# Patient Record
Sex: MALE | Race: BLACK OR AFRICAN AMERICAN | Employment: FULL TIME | URBAN - METROPOLITAN AREA
[De-identification: names, ages, dates, MRNs, and addresses within clinical notes are randomized per-mention and may not be internally consistent; named-entity substitution may affect disease eponyms.]

---

## 2017-09-16 ENCOUNTER — HOSPITAL ENCOUNTER (EMERGENCY)
Age: 27
Discharge: HOME | End: 2017-09-16
Attending: EMERGENCY MEDICINE
Payer: COMMERCIAL

## 2017-09-16 VITALS
BODY MASS INDEX: 22.27 KG/M2 | DIASTOLIC BLOOD PRESSURE: 76 MMHG | SYSTOLIC BLOOD PRESSURE: 120 MMHG | HEART RATE: 60 BPM | WEIGHT: 164.4 LBS | OXYGEN SATURATION: 99 % | HEIGHT: 72 IN | RESPIRATION RATE: 16 BRPM | TEMPERATURE: 98.2 F

## 2017-09-16 DIAGNOSIS — L02.219 CUTANEOUS ABSCESS OF TRUNK, UNSPECIFIED SITE OF TRUNK: Primary | ICD-10-CM

## 2017-09-16 PROCEDURE — 90715 TDAP VACCINE 7 YRS/> IM: CPT | Performed by: EMERGENCY MEDICINE

## 2017-09-16 PROCEDURE — 74011250637 HC RX REV CODE- 250/637: Performed by: EMERGENCY MEDICINE

## 2017-09-16 PROCEDURE — 74011000250 HC RX REV CODE- 250: Performed by: EMERGENCY MEDICINE

## 2017-09-16 PROCEDURE — 99283 EMERGENCY DEPT VISIT LOW MDM: CPT

## 2017-09-16 PROCEDURE — 75810000289 HC I&D ABSCESS SIMP/COMP/MULT

## 2017-09-16 PROCEDURE — 90471 IMMUNIZATION ADMIN: CPT

## 2017-09-16 PROCEDURE — 74011250636 HC RX REV CODE- 250/636: Performed by: EMERGENCY MEDICINE

## 2017-09-16 RX ORDER — CEPHALEXIN 500 MG/1
1000 CAPSULE ORAL
Status: COMPLETED | OUTPATIENT
Start: 2017-09-16 | End: 2017-09-16

## 2017-09-16 RX ORDER — CEPHALEXIN 500 MG/1
500 CAPSULE ORAL 4 TIMES DAILY
Qty: 28 CAP | Refills: 0 | Status: SHIPPED | OUTPATIENT
Start: 2017-09-16 | End: 2017-09-23

## 2017-09-16 RX ORDER — LIDOCAINE HYDROCHLORIDE 20 MG/ML
0.2 INJECTION, SOLUTION INFILTRATION; PERINEURAL ONCE
Status: COMPLETED | OUTPATIENT
Start: 2017-09-16 | End: 2017-09-16

## 2017-09-16 RX ADMIN — LIDOCAINE HYDROCHLORIDE 4 MG: 20 INJECTION, SOLUTION INFILTRATION; PERINEURAL at 15:41

## 2017-09-16 RX ADMIN — TETANUS TOXOID, REDUCED DIPHTHERIA TOXOID AND ACELLULAR PERTUSSIS VACCINE, ADSORBED 0.5 ML: 5; 2.5; 8; 8; 2.5 SUSPENSION INTRAMUSCULAR at 15:42

## 2017-09-16 RX ADMIN — CEPHALEXIN 1000 MG: 500 CAPSULE ORAL at 16:31

## 2017-09-16 NOTE — ED PROVIDER NOTES
Patient is a 32 y.o. male presenting with skin problem. Skin Problem       Pt states that he has had a tender area on the right side of his back for over 2 weeks. He states that he tried to \"drain\" it himself several days ago but it has gotten larger. Skin is locally red, firm with scabbed over area. There is no obvious bony deformity, bruising or extending erythema. Good neurovascular sensation. He has not had any medications today prior to arrival.      History reviewed. No pertinent past medical history. History reviewed. No pertinent surgical history. History reviewed. No pertinent family history. Social History     Social History    Marital status: SINGLE     Spouse name: N/A    Number of children: N/A    Years of education: N/A     Occupational History    Not on file. Social History Main Topics    Smoking status: Never Smoker    Smokeless tobacco: Never Used    Alcohol use Yes    Drug use: Not on file    Sexual activity: Not on file     Other Topics Concern    Not on file     Social History Narrative    No narrative on file         ALLERGIES: Review of patient's allergies indicates no known allergies. Review of Systems   Constitutional: Negative for activity change and appetite change. HENT: Negative for facial swelling, sore throat and trouble swallowing. Eyes: Negative. Respiratory: Negative for shortness of breath. Cardiovascular: Negative. Gastrointestinal: Negative for abdominal pain, diarrhea and vomiting. Genitourinary: Negative for dysuria. Musculoskeletal: Negative for back pain and neck pain. Skin: Positive for rash. Negative for color change. Tender firm, locally red area on the right flank area; suspect infected sebaceous cyst   Neurological: Negative for headaches. Psychiatric/Behavioral: Negative.         Vitals:    09/16/17 1332   BP: 126/83   Pulse: 62   Resp: 18   Temp: 98.3 °F (36.8 °C)   SpO2: 98%   Weight: 74.6 kg (164 lb 6.4 oz) Height: 6' (1.829 m)            Physical Exam   Constitutional: He is oriented to person, place, and time. He appears well-nourished. Black male; smoker   HENT:   Head: Normocephalic. Eyes: Pupils are equal, round, and reactive to light. Neck: Normal range of motion. Neck supple. Cardiovascular: Normal rate and regular rhythm. Pulmonary/Chest: Effort normal and breath sounds normal.   Abdominal: Soft. Bowel sounds are normal.   Musculoskeletal: Normal range of motion. Neurological: He is alert and oriented to person, place, and time. Skin: Skin is warm and dry. 3cm firm nodule with scabbed area on center; no bleeding, drainage or extending erythema   Nursing note and vitals reviewed. [facility]  ED Course       I&D Abcess Complex  Date/Time: 9/16/2017 4:22 PM  Performed by: Toño Ahmadi  Authorized by: Toño Ahmadi     Consent:     Consent obtained:  Verbal    Consent given by:  Patient    Risks discussed:  Incomplete drainage    Alternatives discussed:  No treatment and delayed treatment  Location:     Type:  Abscess (right side mid lateral back)    Size:  3cm    Location: right mid lateral back. Pre-procedure details:     Skin preparation:  Hibiclens  Anesthesia (see MAR for exact dosages): Anesthesia method:  None  Procedure type:     Complexity:  Complex  Procedure details:     Needle aspiration: no      Incision types:  Single with marsupialization    Incision depth:  Subcutaneous    Scalpel blade:  15    Wound management:  Probed and deloculated and irrigated with saline    Drainage:  Purulent    Drainage amount: Moderate    Wound treatment:  Drain placed    Packing materials:  1/4 in gauze  Post-procedure details:     Patient tolerance of procedure: Tolerated well, no immediate complications  Comments:      Reviewed skin recommendations with Jhonny Paulino. Follow up with the dermatologist if no improvement for further evaluation. Use only unscented soaps and lotions.   Good neurovascular sensation before and after the procedure. Janet Nichols NP            4:27 PM  Patient's results and plan of care have been reviewed with him. Patient and/or family have verbally conveyed their understanding and agreement of the patient's signs, symptoms, diagnosis, treatment and prognosis and additionally agree to follow up as recommended or return to the Emergency Room should his condition change prior to follow-up. Discharge instructions have also been provided to the patient with some educational information regarding his diagnosis as well a list of reasons why he would want to return to the ER prior to his follow-up appointment should his condition change. Janet Nichols NP

## 2017-09-16 NOTE — DISCHARGE INSTRUCTIONS
Skin Abscess: Care Instructions  Your Care Instructions    A skin abscess is a bacterial infection that forms a pocket of pus. A boil is a kind of skin abscess. The doctor may have cut an opening in the abscess so that the pus can drain out. You may have gauze in the cut so that the abscess will stay open and keep draining. You may need antibiotics. You will need to follow up with your doctor to make sure the infection has gone away. The doctor has checked you carefully, but problems can develop later. If you notice any problems or new symptoms, get medical treatment right away. Follow-up care is a key part of your treatment and safety. Be sure to make and go to all appointments, and call your doctor if you are having problems. It's also a good idea to know your test results and keep a list of the medicines you take. How can you care for yourself at home? · Apply warm and dry compresses, a heating pad set on low, or a hot water bottle 3 or 4 times a day for pain. Keep a cloth between the heat source and your skin. · If your doctor prescribed antibiotics, take them as directed. Do not stop taking them just because you feel better. You need to take the full course of antibiotics. · Take pain medicines exactly as directed. ¨ If the doctor gave you a prescription medicine for pain, take it as prescribed. ¨ If you are not taking a prescription pain medicine, ask your doctor if you can take an over-the-counter medicine. · Keep your bandage clean and dry. Change the bandage whenever it gets wet or dirty, or at least one time a day. · If the abscess was packed with gauze:  ¨ Keep follow-up appointments to have the gauze changed or removed. If the doctor instructed you to remove the gauze, gently pull out all of the gauze when your doctor tells you to. ¨ After the gauze is removed, soak the area in warm water for 15 to 20 minutes 2 times a day, until the wound closes. When should you call for help?   Call your doctor now or seek immediate medical care if:  · You have signs of worsening infection, such as:  ¨ Increased pain, swelling, warmth, or redness. ¨ Red streaks leading from the infected skin. ¨ Pus draining from the wound. ¨ A fever. Watch closely for changes in your health, and be sure to contact your doctor if:  · You do not get better as expected. Where can you learn more? Go to http://pa-laureen.info/. Enter S314 in the search box to learn more about \"Skin Abscess: Care Instructions. \"  Current as of: October 13, 2016  Content Version: 11.3  © 3296-4844 "[employer]. Care instructions adapted under license by [employer] (which disclaims liability or warranty for this information). If you have questions about a medical condition or this instruction, always ask your healthcare professional. [address] any warranty or liability for your use of this information. Epidermoid Cyst: Care Instructions  Your Care Instructions  An epidermoid (say \"uq-ger-FMR-moyd\") cyst is a lump just under the skin. These cysts can form when a hair follicle becomes blocked. They are common in acne and may occur on the face, neck, back, and genitals. However, they can form anywhere on the body. These cysts are not cancer and do not lead to cancer. They tend not to hurt, but they can sometimes become swollen and painful. They also may break open (rupture) and cause scarring. These cysts sometimes do not cause problems and may not need treatment. If you have a cyst that is swollen and hurts, your doctor may inject it with a medicine to help it heal. But it is more likely that a painful cyst will need to be removed. Your doctor will give you a shot of numbing medicine and cut into the cyst to drain it or remove it. This makes the symptoms go away. Follow-up care is a key part of your treatment and safety.  Be sure to make and go to all appointments, and call your doctor if you are having problems. Its also a good idea to know your test results and keep a list of the medicines you take. How can you care for yourself at home? · Do not squeeze the cyst or poke it with a needle to open it. This can cause swelling, redness, and infection. · Always have a doctor look at any new lumps you get to make sure that they are not serious. When should you call for help? Watch closely for changes in your health, and be sure to contact your doctor if:  · You have a fever, redness, or swelling after you get a shot of medicine in the cyst.  · You see or feel a new lump on your skin. Where can you learn more? Go to http://pa-laureen.info/. Enter T666 in the search box to learn more about \"Epidermoid Cyst: Care Instructions. \"  Current as of: October 13, 2016  Content Version: 11.3  © 6173-4467 [employer], [employer]. Care instructions adapted under license by [employer] (which disclaims liability or warranty for this information). If you have questions about a medical condition or this instruction, always ask your healthcare professional. [address] any warranty or liability for your use of this information.

## 2018-01-04 ENCOUNTER — HOSPITAL ENCOUNTER (EMERGENCY)
Age: 28
Discharge: HOME | End: 2018-01-04
Attending: EMERGENCY MEDICINE | Admitting: EMERGENCY MEDICINE
Payer: COMMERCIAL

## 2018-01-04 VITALS
SYSTOLIC BLOOD PRESSURE: 158 MMHG | WEIGHT: 170.42 LBS | BODY MASS INDEX: 23.08 KG/M2 | OXYGEN SATURATION: 99 % | TEMPERATURE: 97.5 F | RESPIRATION RATE: 16 BRPM | HEIGHT: 72 IN | HEART RATE: 63 BPM | DIASTOLIC BLOOD PRESSURE: 73 MMHG

## 2018-01-04 DIAGNOSIS — Z51.89 WOUND CHECK, ABSCESS: Primary | ICD-10-CM

## 2018-01-04 PROCEDURE — 74011250637 HC RX REV CODE- 250/637: Performed by: PHYSICIAN ASSISTANT

## 2018-01-04 PROCEDURE — 77030019895 HC PCKNG STRP IODO -A

## 2018-01-04 PROCEDURE — 99283 EMERGENCY DEPT VISIT LOW MDM: CPT

## 2018-01-04 RX ORDER — NAPROXEN 250 MG/1
500 TABLET ORAL
Status: COMPLETED | OUTPATIENT
Start: 2018-01-04 | End: 2018-01-04

## 2018-01-04 RX ORDER — SULFAMETHOXAZOLE AND TRIMETHOPRIM 800; 160 MG/1; MG/1
1 TABLET ORAL
Status: COMPLETED | OUTPATIENT
Start: 2018-01-04 | End: 2018-01-04

## 2018-01-04 RX ORDER — NAPROXEN 500 MG/1
500 TABLET ORAL
Qty: 20 TAB | Refills: 0 | Status: SHIPPED | OUTPATIENT
Start: 2018-01-04

## 2018-01-04 RX ORDER — SULFAMETHOXAZOLE AND TRIMETHOPRIM 800; 160 MG/1; MG/1
1 TABLET ORAL 2 TIMES DAILY
COMMUNITY
End: 2018-01-04

## 2018-01-04 RX ORDER — SULFAMETHOXAZOLE AND TRIMETHOPRIM 800; 160 MG/1; MG/1
2 TABLET ORAL 2 TIMES DAILY
Qty: 19 TAB | Refills: 0 | Status: SHIPPED | OUTPATIENT
Start: 2018-01-04 | End: 2018-01-14

## 2018-01-04 RX ORDER — HYDROCODONE BITARTRATE AND ACETAMINOPHEN 5; 325 MG/1; MG/1
1 TABLET ORAL
Qty: 8 TAB | Refills: 0 | Status: SHIPPED | OUTPATIENT
Start: 2018-01-04 | End: 2018-09-25

## 2018-01-04 RX ORDER — HYDROCODONE BITARTRATE AND ACETAMINOPHEN 10; 325 MG/1; MG/1
1 TABLET ORAL
Status: COMPLETED | OUTPATIENT
Start: 2018-01-04 | End: 2018-01-04

## 2018-01-04 RX ORDER — CEPHALEXIN 500 MG/1
500 CAPSULE ORAL 3 TIMES DAILY
COMMUNITY
End: 2018-09-25

## 2018-01-04 RX ADMIN — SULFAMETHOXAZOLE AND TRIMETHOPRIM 1 TABLET: 800; 160 TABLET ORAL at 23:00

## 2018-01-04 RX ADMIN — NAPROXEN 500 MG: 250 TABLET ORAL at 23:33

## 2018-01-04 RX ADMIN — HYDROCODONE BITARTRATE AND ACETAMINOPHEN 1 TABLET: 10; 325 TABLET ORAL at 23:00

## 2018-01-04 NOTE — LETTER
Addended by: TREASURE GUTIERREZ on: 9/12/2017 11:36 AM     Modules accepted: Orders     [address] 
[facility] EMERGENCY DEPT 
[address] [place] [address] [address] [id number] 
[phone] Work/School Note Date: 1/4/2018 To Whom It May concern: 
 
Dian Thomas was seen and treated today in the emergency room by the following provider(s): 
Attending Provider: Bryson Borden. MD [place] 
Physician Assistant: MEJIA Caraballo. Dian Thomas may return to work on 1/7/18 or sooner, if feeling better. Sincerely, Rai Roldan, PA

## 2018-01-05 NOTE — ED PROVIDER NOTES
EMERGENCY DEPARTMENT HISTORY AND PHYSICAL EXAM      Date: 1/4/2018  Patient Name: Agnes Harrell    History of Presenting Illness     Chief Complaint   Patient presents with    Abscess     Pt reports having an abscess on his right forehead today and went to the doctor when they lanced it. Pt reports his right eye is now swollen and reprots the doctor said if the abscess came back to come to the Ed. Pt ambulatory to triage with steady gait. History Provided By: Patient    HPI: Agnes Harrell, 32 y.o. male with no significant PMHx, presents ambulatory to the ED with cc of progressively worsening right eyelid swelling which started this morning when he woke up. Pt notes he had an abscess to his right forehead which was incised today. He notes the swelling worsened after the I&D. Pt was started on Bactrim and Keflex. Pt notes he took the antibiotics as prescribed today. The abscess first appeared two weeks ago. Pt notes his associated pain is moderate. He denies recent fever or vision disturbance. A culture is being done by the physician who incised the abscess. PCP: None    There are no other complaints, changes, or physical findings at this time. Past History     Past Medical History:  History reviewed. No pertinent past medical history. Past Surgical History:  History reviewed. No pertinent surgical history. Family History:  History reviewed. No pertinent family history. Social History:  Social History   Substance Use Topics    Smoking status: Never Smoker    Smokeless tobacco: Never Used    Alcohol use Yes       Allergies:  No Known Allergies      Review of Systems   Review of Systems   Constitutional: Negative. Negative for fever. HENT: Negative. Eyes: Negative for visual disturbance.        + right eyelid swelling     Respiratory: Negative. Cardiovascular: Negative. Gastrointestinal: Negative. Negative for constipation, diarrhea, nausea and vomiting.         Denies liver disease   Endocrine:        Denies thyroid disease   Genitourinary: Negative. Negative for dysuria. Denies kidney disease   Musculoskeletal: Negative. Skin: Positive for wound (incised abscess to right forehead). Neurological: Negative. All other systems reviewed and are negative. Physical Exam   Physical Exam   Constitutional: He is oriented to person, place, and time. He appears well-developed and well-nourished. No distress. HENT:   Right Ear: External ear normal.   Left Ear: External ear normal.   Nose: Nose normal.   Mouth/Throat: Oropharynx is clear and moist. No oropharyngeal exudate. Horizontal surgical laceration to right forehead superior to eyebrow with mild erythema and induration. Applied pressure with approximately 0.5 mL of purulent material expressed. Eyes: Conjunctivae and EOM are normal. Pupils are equal, round, and reactive to light. Right eye exhibits no discharge. Left eye exhibits no discharge. Right conjunctiva is not injected. No scleral icterus. Swelling of right upper eyelid without erythema or calor. Swelling of eyelid does not appear to be cellulitic in nature. No pain with EOM. Neck: Normal range of motion. Neck supple. No tracheal deviation present. Cardiovascular: Normal rate, regular rhythm, normal heart sounds and intact distal pulses. Exam reveals no gallop and no friction rub. No murmur heard. Pulmonary/Chest: Effort normal and breath sounds normal. No respiratory distress. He has no wheezes. He has no rales. He exhibits no tenderness. Musculoskeletal: He exhibits no edema or tenderness. Lymphadenopathy:     He has no cervical adenopathy. Neurological: He is alert and oriented to person, place, and time. No cranial nerve deficit. Coordination normal.   Skin: Skin is warm and dry. No rash noted. No erythema. Psychiatric: He has a normal mood and affect.  His behavior is normal. Judgment and thought content normal.   Nursing note and vitals reviewed. Medical Decision Making   I am the first provider for this patient. I reviewed the vital signs, available nursing notes, past medical history, past surgical history, family history and social history. Vital Signs-Reviewed the patient's vital signs. Patient Vitals for the past 12 hrs:   Temp Pulse Resp BP SpO2   01/04/18 2203 97.5 °F (36.4 °C) 63 16 158/73 99 %     Records Reviewed: Nursing Notes and Old Medical Records    Provider Notes (Medical Decision Making):   DDx: wound check, abscess, cellulitis, eyelid swelling    ED Course:   Initial assessment performed. The patients presenting problems have been discussed, and they are in agreement with the care plan formulated and outlined with them. I have encouraged them to ask questions as they arise throughout their visit. 11:03 PM  Discussed patient with Dr. Tone Zheng. 11:05 PM  Dr. Tone Zheng at bedside to evaluate patient. She agrees with the care plan. Procedure Note - Wound Care:   11:06 PM  Performed by: Mack Smith  Complexity: complex due to location  Skin prepped with Hibiclens. Sterile field established. Pressure applied to abscess to right forehead and 1 to 1.5 mLs of thin, bloody, purulent drainage was expressed. Wound probed and irrigated. Area was packed using 0.25 inch iodoform gauze. Sterile dressing applied. Estimated blood loss: 1 mL  The procedure took 1-15 minutes, and pt tolerated well. Disposition:  DISCHARGE NOTE  11:33 PM  The patient has been re-evaluated and is ready for discharge. Reviewed available results with patient. Counseled patient on diagnosis and care plan. Patient has expressed understanding, and all questions have been answered. Patient agrees with plan and agrees to follow up as recommended, or return to the ED if their symptoms worsen. Discharge instructions have been provided and explained to the patient, along with reasons to return to the ED. PLAN:  1.    Current Discharge Medication List      START taking these medications    Details   naproxen (NAPROSYN) 500 mg tablet Take 1 Tab by mouth every twelve (12) hours as needed for Pain (swelling). Qty: 20 Tab, Refills: 0      HYDROcodone-acetaminophen (NORCO) 5-325 mg per tablet Take 1 Tab by mouth every six (6) hours as needed for Pain. Max Daily Amount: 4 Tabs. Qty: 8 Tab, Refills: 0    Associated Diagnoses: Wound check, abscess         CONTINUE these medications which have CHANGED    Details   trimethoprim-sulfamethoxazole (BACTRIM DS) 160-800 mg per tablet Take 2 Tabs by mouth two (2) times a day for 10 days. This modifies your previous bactrim prescription. Qty: 19 Tab, Refills: 0           2. Follow-up Information     Follow up With Details Comments Contact Info    [facility] EMERGENCY DEPT In 2 days For wound re-check [address] [address]  [phone]        Return to ED if worse     Diagnosis     Clinical Impression:   1. Wound check, abscess        Attestations:    Attestation Note:  This note is prepared by [facility], acting as Scribe for [employer]    PA-C Bess Console: The scribe's documentation has been prepared under my direction and personally reviewed by me in its entirety. I confirm that the note above accurately reflects all work, treatment, procedures, and medical decision making performed by me.

## 2018-09-25 ENCOUNTER — HOSPITAL ENCOUNTER (EMERGENCY)
Age: 28
Discharge: HOME | End: 2018-09-25
Attending: EMERGENCY MEDICINE
Payer: SELF-PAY

## 2018-09-25 VITALS
SYSTOLIC BLOOD PRESSURE: 146 MMHG | HEART RATE: 60 BPM | HEIGHT: 72 IN | DIASTOLIC BLOOD PRESSURE: 89 MMHG | TEMPERATURE: 98 F | RESPIRATION RATE: 14 BRPM | OXYGEN SATURATION: 99 % | BODY MASS INDEX: 22.16 KG/M2 | WEIGHT: 163.58 LBS

## 2018-09-25 DIAGNOSIS — L02.01 ABSCESS OF FACE: Primary | ICD-10-CM

## 2018-09-25 PROCEDURE — 10160 PNXR ASPIR ABSC HMTMA BULLA: CPT

## 2018-09-25 PROCEDURE — 99282 EMERGENCY DEPT VISIT SF MDM: CPT

## 2018-09-25 RX ORDER — AMOXICILLIN 250 MG/1
500 CAPSULE ORAL 2 TIMES DAILY
COMMUNITY
Start: 2018-09-18

## 2018-09-25 RX ORDER — SULFAMETHOXAZOLE AND TRIMETHOPRIM 800; 160 MG/1; MG/1
1 TABLET ORAL 2 TIMES DAILY
COMMUNITY
Start: 2018-09-18

## 2018-09-25 NOTE — ED NOTES
MEJIA Sharpe have reviewed discharge instructions with the patient. The patient verbalized understanding.

## 2018-09-25 NOTE — DISCHARGE INSTRUCTIONS
Skin Abscess: After Your Visit to the Emergency Room  Your Care Instructions  You were seen in the emergency room because you had a skin abscess. This is a bacterial infection that forms a pocket of pus. It is also known as a \"boil. \"  The doctor may have cut a small opening in the abscess so that the pus can drain out. You may have gauze in the cut so that the abscess will stay open and keep draining. You may need antibiotics. You will need to follow up with your doctor to make sure the infection has gone away. Even though you have been released from the emergency room, you still need to watch for any problems. The doctor carefully checked you. But sometimes problems can develop later. If you have new symptoms, or if your symptoms do not get better, return to the emergency room or call your doctor right away. A visit to the emergency room is only one step in your treatment. Even if you feel better, you still need to do what your doctor recommends, such as going to all suggested follow-up appointments and taking medicines exactly as directed. This will help you recover and help prevent future problems. How can you care for yourself at home? · If you were given antibiotics, take them as directed. Do not stop taking them just because you feel better. You need to take the full course of antibiotics. · Take pain medicines exactly as directed. If the doctor gave you a prescription medicine for pain, take it as prescribed. If you are not taking a prescription pain medicine, ask your doctor if you can take an over-the-counter medicine. · Apply a heating pad set on low or a hot water bottle 3 or 4 times a day for pain. Keep a cloth between the heat source and your skin. · Keep your bandage clean and dry. Change the bandage whenever it gets wet or dirty, or at least one time a day. · If the abscess was packed with gauze:  ¨ Keep follow-up appointments to have the gauze changed or removed.  If your doctor instructed you to remove the gauze, gently pull out all of the gauze when your doctor tells you to. ¨ After the gauze is removed, soak the area in warm water for 15 to 20 minutes two times a day, until the wound closes. When should you call for help? Call your doctor today if:  · You think the abscess is getting worse. · You have a new or higher fever. · Your pain gets worse. · You have any problems with the gauze in your abscess. Where can you learn more? Go to MobileHelp.be  Enter M703 in the search box to learn more about \"Skin Abscess: After Your Visit to the Emergency Room. \"   © 2678-7444 [employer]wise, [employer]. Care instructions adapted under license by [facility] (which disclaims liability or warranty for this information). This care instruction is for use with your licensed healthcare professional. If you have questions about a medical condition or this instruction, always ask your healthcare professional. [place] [address] any warranty or liability for your use of this information. Content Version: 9.4.85541;  Last Revised: September 29, 2010

## 2018-09-25 NOTE — ED PROVIDER NOTES
EMERGENCY DEPARTMENT HISTORY AND PHYSICAL EXAM 
 
 
Date: 9/25/2018 Patient Name: Stacia García History of Presenting Illness Chief Complaint Patient presents with  
 Skin Problem Abcess to left chin. [facility] and told to follow up with dermatologist. Leon Basurto get an appt. til next year. Patient has been taking amoxicillin as prescribed. History Provided By: Patient HPI: Stacia García, 25 y.o. male presents to the ED with cc of an abscess to the L side of chin. Pt notes that the knot started forming several weeks ago. Last week he went to [facility] and was placed on Bactrim and Augmentin, which have not improved the Sx despite taking them as directed. Notes the pain is mild. There are no other complaints, changes, or physical findings at this time. Social Hx: Tobacco (denies), EtOH (social), Illicit drug use (marijuana rarely) PCP: None Current Outpatient Prescriptions Medication Sig Dispense Refill  amoxicillin (AMOXIL) 250 mg capsule Take 500 mg by mouth two (2) times a day.  trimethoprim-sulfamethoxazole (BACTRIM DS) 160-800 mg per tablet Take 1 Tab by mouth two (2) times a day.  naproxen (NAPROSYN) 500 mg tablet Take 1 Tab by mouth every twelve (12) hours as needed for Pain (swelling). 20 Tab 0 Past History Past Medical History: No past medical history on file. Past Surgical History: No past surgical history on file. Family History: No family history on file. Social History: 
Social History Substance Use Topics  Smoking status: Never Smoker  Smokeless tobacco: Never Used  Alcohol use Yes Allergies: 
No Known Allergies Review of Systems Review of Systems Constitutional: Negative for chills, diaphoresis and fever. HENT: Negative for congestion, ear pain, rhinorrhea and sore throat. Respiratory: Negative for cough and shortness of breath. Cardiovascular: Negative for chest pain. Gastrointestinal: Negative for abdominal pain, constipation, diarrhea, nausea and vomiting. Genitourinary: Negative for difficulty urinating, dysuria, frequency and hematuria. Musculoskeletal: Negative for arthralgias and myalgias. Skin:  
     + Facial abscess Neurological: Negative for headaches. All other systems reviewed and are negative. Physical Exam  
Physical Exam  
Constitutional: He is oriented to person, place, and time. He appears well-developed and well-nourished. No distress. Pleasant 25 y.o. -American male HENT:  
Head: Normocephalic and atraumatic. \"pea\" sized nodule to the L chin without drainage or surrounding erythema. Eyes: Conjunctivae are normal. Right eye exhibits no discharge. Left eye exhibits no discharge. Neck: Normal range of motion. Neck supple. Cardiovascular: Normal rate. Pulmonary/Chest: Effort normal.  
Neurological: He is alert and oriented to person, place, and time. Skin: Skin is warm and dry. He is not diaphoretic. Psychiatric: He has a normal mood and affect. His behavior is normal.  
Nursing note and vitals reviewed. Diagnostic Study Results Labs - None Radiologic Studies - None Medical Decision Making I am the first provider for this patient. I reviewed the vital signs, available nursing notes, past medical history, past surgical history, family history and social history. Vital Signs-Reviewed the patient's vital signs. Patient Vitals for the past 12 hrs: 
 Temp Pulse Resp BP SpO2  
09/25/18 1223 98 °F (36.7 °C) 60 14 146/89 99 % Records Reviewed: Nursing Notes Provider Notes (Medical Decision Making):  
sebaceous cyst, abscess, folliculitis ED Course:  
Initial assessment performed.  The patients presenting problems have been discussed, and they are in agreement with the care plan formulated and outlined with them. I have encouraged them to ask questions as they arise throughout their visit. Discussion aspiration vs. I&D with the patient. He would prefer not to have scar from I&D on his face. Will attempt aspiration and continued Abx at home for an additional 2 days of warm compresses and Abx. If not improved will return to the ED for I&D Procedure Note - Aspiration 2:46 PM 
Performed by: MEJIA Carr Complexity: Simple Skin prepped with alcohol. 18 gauge needle was inserted into the abscess to face (left jaw). Unable to aspirate any drainage. Manually expressed a small amount of purulent drainage. Bandaid applied. Estimated blood loss: < 1 mL The procedure took 1-15 minutes, and pt tolerated well. Critical Care Time:  
None Disposition: 
DISCHARGE NOTE: 
2:50 PM 
The pt is ready for discharge. The pt's signs, symptoms, diagnosis, and discharge instructions have been discussed and pt has conveyed their understanding. The pt is to follow up as recommended or return to ER should their symptoms worsen. Plan has been discussed and pt is in agreement. PLAN: 
1. Current Discharge Medication List  
  
CONTINUE these medications which have NOT CHANGED Details  
amoxicillin (AMOXIL) 250 mg capsule Take 500 mg by mouth two (2) times a day. trimethoprim-sulfamethoxazole (BACTRIM DS) 160-800 mg per tablet Take 1 Tab by mouth two (2) times a day. naproxen (NAPROSYN) 500 mg tablet Take 1 Tab by mouth every twelve (12) hours as needed for Pain (swelling). Qty: 20 Tab, Refills: 0  
  
  
 
2. Follow-up Information Follow up With Details Comments Contact Info [facility] EMERGENCY DEPT In 2 days for wound check [address]   [phone]  
  
3. Wound care as discussed. Return to ED if worse Diagnosis Clinical Impression: 1. Abscess of face 8:30 AM 
I was personally available for consultation in the emergency department. I have reviewed the chart and agree with the documentation recorded by the [facility], including the assessment, treatment plan, and disposition.  
Sandra Cowan MD

## 2018-09-25 NOTE — ED NOTES
Pt went to abscess last week. Pt was given antibiotics for 10 days, still currently taking. Pt reports hx of abscess on his forehead and one on his back that had to be drained. Forehead abscess was noted in January.